# Patient Record
Sex: MALE | Race: OTHER | HISPANIC OR LATINO | URBAN - METROPOLITAN AREA
[De-identification: names, ages, dates, MRNs, and addresses within clinical notes are randomized per-mention and may not be internally consistent; named-entity substitution may affect disease eponyms.]

---

## 2018-01-29 ENCOUNTER — IMPORTED ENCOUNTER (OUTPATIENT)
Dept: URBAN - METROPOLITAN AREA CLINIC 50 | Facility: CLINIC | Age: 43
End: 2018-01-29

## 2018-01-30 ENCOUNTER — IMPORTED ENCOUNTER (OUTPATIENT)
Dept: URBAN - METROPOLITAN AREA CLINIC 50 | Facility: CLINIC | Age: 43
End: 2018-01-30

## 2021-04-17 ASSESSMENT — VISUAL ACUITY
OS_SC: 20/40
OS_PH: 20/20-2
OD_PH: 20/25-2

## 2021-04-17 ASSESSMENT — TONOMETRY
OS_IOP_MMHG: 22
OD_IOP_MMHG: 22

## 2021-12-14 NOTE — PATIENT DISCUSSION
The patient understands that an attempt to call and inform them of the result will be made within 2 weeks.

## 2021-12-14 NOTE — PATIENT DISCUSSION
The patient has presented with one or more eyelid or facial lesions with growth, change, irritation or abnormal appearance.

## 2021-12-14 NOTE — PATIENT DISCUSSION
The patient understands that the results of the pathology report will be back in approximately 7 days.

## 2021-12-14 NOTE — PROCEDURE NOTE: CLINICAL
PROCEDURE NOTE: Excision of Eyelid Lesion, Defect Size Small; Simple Closure or Cautery #1 Right Lower Lid. Diagnosis: Other Benign Neoplasm of Skin of Eyelid, Including [place].